# Patient Record
Sex: FEMALE | Race: WHITE | NOT HISPANIC OR LATINO | URBAN - NONMETROPOLITAN AREA
[De-identification: names, ages, dates, MRNs, and addresses within clinical notes are randomized per-mention and may not be internally consistent; named-entity substitution may affect disease eponyms.]

---

## 2024-04-24 ENCOUNTER — OFFICE (OUTPATIENT)
Dept: URBAN - NONMETROPOLITAN AREA CLINIC 1 | Facility: CLINIC | Age: 29
End: 2024-04-24

## 2024-04-24 VITALS
WEIGHT: 125 LBS | SYSTOLIC BLOOD PRESSURE: 130 MMHG | SYSTOLIC BLOOD PRESSURE: 125 MMHG | DIASTOLIC BLOOD PRESSURE: 95 MMHG | HEART RATE: 89 BPM | HEIGHT: 62 IN | DIASTOLIC BLOOD PRESSURE: 100 MMHG

## 2024-04-24 DIAGNOSIS — Z79.899 OTHER LONG TERM (CURRENT) DRUG THERAPY: ICD-10-CM

## 2024-04-24 DIAGNOSIS — K50.90 CROHN'S DISEASE, UNSPECIFIED, WITHOUT COMPLICATIONS: ICD-10-CM

## 2024-04-24 PROCEDURE — 99214 OFFICE O/P EST MOD 30 MIN: CPT | Performed by: NURSE PRACTITIONER

## 2024-04-24 PROCEDURE — 36415 COLL VENOUS BLD VENIPUNCTURE: CPT | Performed by: NURSE PRACTITIONER

## 2024-07-03 ENCOUNTER — AMBULATORY SURGICAL CENTER (OUTPATIENT)
Dept: URBAN - NONMETROPOLITAN AREA SURGERY 1 | Facility: SURGERY | Age: 29
End: 2024-07-03

## 2024-07-03 ENCOUNTER — OFFICE (OUTPATIENT)
Dept: URBAN - METROPOLITAN AREA PATHOLOGY 15 | Facility: PATHOLOGY | Age: 29
End: 2024-07-03
Payer: COMMERCIAL

## 2024-07-03 VITALS
SYSTOLIC BLOOD PRESSURE: 99 MMHG | SYSTOLIC BLOOD PRESSURE: 100 MMHG | OXYGEN SATURATION: 98 % | SYSTOLIC BLOOD PRESSURE: 100 MMHG | HEART RATE: 73 BPM | DIASTOLIC BLOOD PRESSURE: 76 MMHG | OXYGEN SATURATION: 99 % | RESPIRATION RATE: 16 BRPM | HEART RATE: 74 BPM | HEART RATE: 78 BPM | DIASTOLIC BLOOD PRESSURE: 67 MMHG | SYSTOLIC BLOOD PRESSURE: 98 MMHG | DIASTOLIC BLOOD PRESSURE: 57 MMHG | SYSTOLIC BLOOD PRESSURE: 119 MMHG | DIASTOLIC BLOOD PRESSURE: 64 MMHG | DIASTOLIC BLOOD PRESSURE: 47 MMHG | TEMPERATURE: 97.5 F | SYSTOLIC BLOOD PRESSURE: 87 MMHG | DIASTOLIC BLOOD PRESSURE: 57 MMHG | RESPIRATION RATE: 16 BRPM | HEART RATE: 73 BPM | TEMPERATURE: 98.3 F | DIASTOLIC BLOOD PRESSURE: 58 MMHG | SYSTOLIC BLOOD PRESSURE: 100 MMHG | HEIGHT: 62 IN | RESPIRATION RATE: 17 BRPM | OXYGEN SATURATION: 100 % | SYSTOLIC BLOOD PRESSURE: 96 MMHG | OXYGEN SATURATION: 98 % | HEIGHT: 62 IN | HEART RATE: 78 BPM | SYSTOLIC BLOOD PRESSURE: 96 MMHG | HEART RATE: 74 BPM | DIASTOLIC BLOOD PRESSURE: 64 MMHG | SYSTOLIC BLOOD PRESSURE: 119 MMHG | TEMPERATURE: 97.5 F | RESPIRATION RATE: 19 BRPM | TEMPERATURE: 97.5 F | DIASTOLIC BLOOD PRESSURE: 76 MMHG | DIASTOLIC BLOOD PRESSURE: 58 MMHG | OXYGEN SATURATION: 96 % | RESPIRATION RATE: 18 BRPM | RESPIRATION RATE: 16 BRPM | HEART RATE: 74 BPM | RESPIRATION RATE: 19 BRPM | OXYGEN SATURATION: 99 % | DIASTOLIC BLOOD PRESSURE: 47 MMHG | OXYGEN SATURATION: 96 % | WEIGHT: 125 LBS | DIASTOLIC BLOOD PRESSURE: 67 MMHG | RESPIRATION RATE: 20 BRPM | OXYGEN SATURATION: 100 % | HEART RATE: 89 BPM | HEART RATE: 89 BPM | DIASTOLIC BLOOD PRESSURE: 47 MMHG | RESPIRATION RATE: 20 BRPM | OXYGEN SATURATION: 100 % | HEART RATE: 89 BPM | HEART RATE: 94 BPM | OXYGEN SATURATION: 96 % | RESPIRATION RATE: 20 BRPM | DIASTOLIC BLOOD PRESSURE: 57 MMHG | HEART RATE: 78 BPM | SYSTOLIC BLOOD PRESSURE: 98 MMHG | RESPIRATION RATE: 19 BRPM | DIASTOLIC BLOOD PRESSURE: 58 MMHG | SYSTOLIC BLOOD PRESSURE: 87 MMHG | RESPIRATION RATE: 17 BRPM | OXYGEN SATURATION: 98 % | SYSTOLIC BLOOD PRESSURE: 99 MMHG | RESPIRATION RATE: 18 BRPM | SYSTOLIC BLOOD PRESSURE: 87 MMHG | RESPIRATION RATE: 17 BRPM | OXYGEN SATURATION: 99 % | SYSTOLIC BLOOD PRESSURE: 96 MMHG | WEIGHT: 125 LBS | DIASTOLIC BLOOD PRESSURE: 64 MMHG | SYSTOLIC BLOOD PRESSURE: 98 MMHG | HEART RATE: 94 BPM | SYSTOLIC BLOOD PRESSURE: 99 MMHG | DIASTOLIC BLOOD PRESSURE: 67 MMHG | SYSTOLIC BLOOD PRESSURE: 119 MMHG | HEART RATE: 73 BPM | HEIGHT: 62 IN | DIASTOLIC BLOOD PRESSURE: 76 MMHG | TEMPERATURE: 98.3 F | RESPIRATION RATE: 18 BRPM | TEMPERATURE: 98.3 F | WEIGHT: 125 LBS | HEART RATE: 94 BPM

## 2024-07-03 DIAGNOSIS — K63.89 OTHER SPECIFIED DISEASES OF INTESTINE: ICD-10-CM

## 2024-07-03 DIAGNOSIS — K50.90 CROHN'S DISEASE, UNSPECIFIED, WITHOUT COMPLICATIONS: ICD-10-CM

## 2024-07-03 PROCEDURE — 45380 COLONOSCOPY AND BIOPSY: CPT | Performed by: INTERNAL MEDICINE

## 2024-07-03 PROCEDURE — 88342 IMHCHEM/IMCYTCHM 1ST ANTB: CPT | Performed by: STUDENT IN AN ORGANIZED HEALTH CARE EDUCATION/TRAINING PROGRAM

## 2024-07-03 PROCEDURE — 88305 TISSUE EXAM BY PATHOLOGIST: CPT | Performed by: STUDENT IN AN ORGANIZED HEALTH CARE EDUCATION/TRAINING PROGRAM

## 2024-07-03 RX ADMIN — PROPOFOL 400 MG: 10 INJECTION, EMULSION INTRAVENOUS at 08:11

## 2024-07-10 LAB
GASTRO ONE PATHOLOGY: PDF REPORT: (no result)

## 2025-07-14 ENCOUNTER — OFFICE (OUTPATIENT)
Dept: URBAN - NONMETROPOLITAN AREA CLINIC 1 | Facility: CLINIC | Age: 30
End: 2025-07-14
Payer: COMMERCIAL

## 2025-07-14 VITALS
SYSTOLIC BLOOD PRESSURE: 130 MMHG | HEIGHT: 62 IN | DIASTOLIC BLOOD PRESSURE: 80 MMHG | WEIGHT: 146 LBS | HEART RATE: 78 BPM

## 2025-07-14 DIAGNOSIS — K50.90 CROHN'S DISEASE, UNSPECIFIED, WITHOUT COMPLICATIONS: ICD-10-CM

## 2025-07-14 PROCEDURE — 99213 OFFICE O/P EST LOW 20 MIN: CPT | Performed by: NURSE PRACTITIONER

## 2025-07-14 NOTE — SERVICEHPINOTES
Sophia is a 29-year-old female that presents today for follow-up of Crohn's disease.   She was diagnosed with Crohn's 2016 when she presented to the hospital with an obstruction.  She was on Humira until September 2024 when she was started on Rinvoq instead.  She also has eczema, and the Rinvoq also helps her skin.    br     She reports feeling well on Rinvoq 30 mg daily.  She is not having abdominal pain or diarrhea or bloody stools.  Today we will check her labs and we will plan to follow up with her every 6 months.
jasson
br
 Her last colonoscopy was done 07/2024 by Dr. Washington that showed active disease.  We will plan to repeat her colonoscopy summer of 2026.
br
br

br
br GI History:other with crohn's disease.4/2016 egd/colon by dr washington-brPOSTOPERATIVE DIAGNOSES:br1. Circular red spots of the gastric fundus, suspect NG trauma.br2. Normal colon.br3. Stenotic friable ileocecal valve dilated to 13.5 mm with TTS balloon dilators..br4. Sclerotic distal 3 cm of terminal ileum without erosions noted.brFinal Pathologic Diagnosis:br1. DUODENUM, BIOPSY:brHistologically normal duodenal mucosa.brNegative for features of celiac disease.br2. STOMACH, BIOPSY:brFundus/body mucosa showing minor features of reactive gastropathy.brNegative for H. pylori by immunohistochemical staining.br3. RIGHT COLON, BIOPSY:brTrace melanosis coli, otherwise histologically normal colonic mucosa negative for colitis.br4. TERMINAL ILEUM, BIOPSY:brIleocolonic mucosa showing benign mucosal erosion/ulcer (see comment).brEpithelial changes are negative for dysplasia.brComment:brThe isolated finding in the ileocecal valve/terminal ileum (specimen 4), while compatible with Crohn enteritis, is not definitively diagnostic. The differential diagnosis includes NSAID-related injury and isolated or idiopathic ileal erosion.08/14/2017 colonoscopy by Dr. min-in the ileum moderate intrinsic stenosis was noted. This was not secondary to any significant inflammation. Mild localized Crohn's disease was seen. Mid descending colon a possible inflammatory polyp seen. This was removedbrContinue Humira. Stay on low residue diet given ileal stenosis.1/2018 colonoscopy by dr min-brFindings: Moderately severe localized stenosis was found to be causing a moderate obstruction in the terminal ileum. The obstruction was not traversed. Dilatation was performed, using a balloon. A 12-13.5-14 mm dilator was passed. Moderate force was required. The largest dilator measured 13.5 mm. The post-procedural endoscopic appearances suggest a mucosal tear. Otherwise, the colon appeared to be normal. There was no evidence of active endoscopic inflammation in the terminal ileum or in the entire colon.Final Pathologic Diagnosis:br1. EXCISION APPENDIX:br- Incidental appendix with mild lumen dilatation associated with intraluminal exudate with patchy nonspecific mucosalbrinflammation. No evidence transmural inflammation or perforation.br2. RIGHT HEMICOLECTOMY:br- Terminal ileum with partial stenosis and associated mucosal ulceration/chronic active ileitis consistent with a clinical history of Crohn's disease.br- No evidence of dysplasia.br- Serosal fibrous adhesions.br- End margins viable, no evidence of active colitis/ileitis at the excisional margins.brRecommendations: Discontinue steroids. Continue Humira. Resume regular low residue diet. Recommend general surgery consultation.1/2018 by dr mccarty POSTOPERATIVE DIAGNOSES:br1. Crohn disease.br2. Small bowel stricture.br3. Enteroenteric fistula.PROCEDURE: Robotic ileocecectomy.9/13/22 ct a/p without-IMPRESSION:brNo evidence of acute abdominal or pelvic process.brTiny nonobstructing left renal calculi.07/27/2020 colonoscopy by Dr. Min at the [facility]. The quality of the prep was good. No evidence ileitis or colitis. At the ileocolonic anastomosis no abnormalities were seen no obvious irritation noted.brPathology terminal ileum mucosa with normal villous architecture and no significant change. Negative for active ileitis granulomas dysplasia or malignancy. Colon random biopsies mildly active colitis. Negative foir viral affect granuloma formation dysplasia or malignancy.9/2022 c diff negative. Fecal zarina 719. Stool for h pylori and culture negative. quanteriferon gold and hiv non reactive. Acute panel unremarkable. CRP 53.9/2022 colonoscopy by dr chapa-Findings: Anastomosis identified in the right colon consistent with history of right hemicolectomy. Anastomosis is well-healed and widely patent without evidence of significant inflammation. I was able intubate the neoterminal ileum for 10 to 15 centimeters. There were a few very small ulcerations and some erythematous appearance of this tissue. Multiple random biopsies were taken from the terminal ileum. Colon itself otherwise though appeared normal. Normal vascular pattern without evidence of polyp, mass, or diverticular disease. Random biopsies were taken throughout the colon.Recommendations: Follow up on biopsy results. Suspect this is a flare of the patient's Crohn's disease. I agree with continuing workup to reinstitute treatment with Humira. I will start the patient on a short course of prednisone to see if we can alleviate her current symptomsFinal Pathologic Diagnosis:br1. ENDOSCOPIC BIOPSY "TERMINAL ILEUM":br- Benign small intestinal type mucosa with focal mild nonspecific ileitis. The differential diagnosis includes prolapse, NSAIDS, Crohn's disease in the appropriate endoscopic setting.br- No evidence of villous atrophy or dysplasia.br2. ENDOSCOPIC BIOPSY "RANDOM COLON":br- Benign colonic mucosa with rare mucosal associated lymphoid aggregates, no evidence of active colitis.I have reviewed the pathology results and below are my recommendations.brSome mild nonspecific inflammation noted on biopsies from the terminal ileum. I suspect this is likely some activity of Crohn's disease. I hope patient will have clinical improvement with resumption of Humira. She should continue to avoid NSAIDs. The colon biopsies showed no evidence of active colitis.12/8/22 crp wnl 0.9.7/3/[address] short- active disease. advised started rinvoqbrpt started on rinvoq 45mg daily for 12 weeks then decreased to 15mg daily for maintence dosing.